# Patient Record
Sex: MALE | Race: ASIAN | NOT HISPANIC OR LATINO | URBAN - METROPOLITAN AREA
[De-identification: names, ages, dates, MRNs, and addresses within clinical notes are randomized per-mention and may not be internally consistent; named-entity substitution may affect disease eponyms.]

---

## 2020-09-11 ENCOUNTER — LAB OUTSIDE AN ENCOUNTER (OUTPATIENT)
Dept: URBAN - METROPOLITAN AREA CLINIC 96 | Facility: CLINIC | Age: 40
End: 2020-09-11

## 2020-09-11 ENCOUNTER — WEB ENCOUNTER (OUTPATIENT)
Dept: URBAN - METROPOLITAN AREA CLINIC 96 | Facility: CLINIC | Age: 40
End: 2020-09-11

## 2020-09-11 ENCOUNTER — OFFICE VISIT (OUTPATIENT)
Dept: URBAN - METROPOLITAN AREA CLINIC 96 | Facility: CLINIC | Age: 40
End: 2020-09-11
Payer: COMMERCIAL

## 2020-09-11 VITALS
DIASTOLIC BLOOD PRESSURE: 85 MMHG | HEART RATE: 97 BPM | BODY MASS INDEX: 27.32 KG/M2 | HEIGHT: 66 IN | WEIGHT: 170 LBS | SYSTOLIC BLOOD PRESSURE: 145 MMHG | TEMPERATURE: 97.9 F

## 2020-09-11 DIAGNOSIS — K21.0 REFLUX ESOPHAGITIS: ICD-10-CM

## 2020-09-11 DIAGNOSIS — K82.8 GALLBLADDER SLUDGE: ICD-10-CM

## 2020-09-11 DIAGNOSIS — R10.9 RIGHT SIDED ABDOMINAL PAIN: ICD-10-CM

## 2020-09-11 DIAGNOSIS — R19.4 CHANGE IN BOWEL HABITS: ICD-10-CM

## 2020-09-11 DIAGNOSIS — E66.3 OVERWEIGHT: ICD-10-CM

## 2020-09-11 DIAGNOSIS — R93.2 ABNORMAL ULTRASOUND OF GALLBLADDER: ICD-10-CM

## 2020-09-11 LAB
ABSOLUTE BASOPHIL COUNT: 0.04
ABSOLUTE EOSINOPHIL COUNT: 0.1
ABSOLUTE IMMATURE GRANULOCYTE  COUNT: 0.01
ABSOLUTE LYMPHOCYTE COUNT: 2.24
ABSOLUTE MONOCYTE COUNT: 0.39
ABSOLUTE NEUTROPHIL COUNT (ANC): 2.05
ABSOLUTE NRBC  COUNT: 0
AG RATIO: 2
ALBUMIN LEVEL: 4.9
ALK PHOS: 82
ALT: 94
ANION GAP: 9
AST: 46
BASOPHIL AUTO: 1
BILIRUBIN TOTAL: 0.4
BUN/CREAT RATIO: 8
BUN: 9
CALCIUM LEVEL: 9.4
CHLORIDE LEVEL: 103
CO2 LEVEL: 26
CREATININE LEVEL: 1.1
EOS AUTO: 2
GFR AFRICAN AMERICAN: >60
GFR NON AFRICAN AMERICAN: >60
GLUCOSE LEVEL: 238
HCT: 44.3
HGB: 14.5
IMMATURE GRANULOCYTES AUTO: 0.2
LYMPH AUTO: 46
MCH: 28.8
MCHC: 32.7
MCV: 87.9
MONO AUTO: 8
MPV: 11
NEUTRO AUTO: 42
NRBC AUTO: 0
OSMO (CALC): 282
PERFORMING LAB: (no result)
PLATELETS: 276
POTASSIUM LEVEL: 4.3
PROTEIN TOTAL: 7.9
RBC: 5.04
RDW: 12.4
SODIUM LEVEL: 138
WBC: 4.8

## 2020-09-11 PROCEDURE — 99213 OFFICE O/P EST LOW 20 MIN: CPT | Performed by: INTERNAL MEDICINE

## 2020-09-11 RX ORDER — PANTOPRAZOLE SODIUM 40 MG/1
TAKE 1 TABLET (40 MG) BY ORAL ROUTE ONCE DAILY FOR 90 DAYS TABLET, DELAYED RELEASE ORAL 1
Qty: 90 | Refills: 1 | Status: ACTIVE | COMMUNITY
Start: 2020-05-28 | End: 2020-11-24

## 2020-09-11 RX ORDER — PANTOPRAZOLE SODIUM 40 MG/1
1 TABLET TABLET, DELAYED RELEASE ORAL ONCE A DAY
Qty: 30 TABLET | Refills: 5 | OUTPATIENT
Start: 2020-09-11

## 2020-09-11 RX ORDER — ATORVASTATIN CALCIUM 20 MG/1
TABLET, FILM COATED ORAL
Qty: 0 | Refills: 0 | Status: ACTIVE | COMMUNITY
Start: 1900-01-01

## 2020-09-11 NOTE — HPI-OTHER HISTORIES
40 y.o. [place] male On R side, on and off, will have pain - not constant - little bit Has pain w/ going to bathroom Just started 2-3 days ago No blood in stool No fevers No N/V Feels better w/ food Not doing much exercise

## 2020-09-12 ENCOUNTER — WEB ENCOUNTER (OUTPATIENT)
Dept: URBAN - METROPOLITAN AREA CLINIC 92 | Facility: CLINIC | Age: 40
End: 2020-09-12

## 2021-02-15 ENCOUNTER — OFFICE VISIT (OUTPATIENT)
Dept: URBAN - METROPOLITAN AREA CLINIC 96 | Facility: CLINIC | Age: 41
End: 2021-02-15
Payer: COMMERCIAL

## 2021-02-15 ENCOUNTER — WEB ENCOUNTER (OUTPATIENT)
Dept: URBAN - METROPOLITAN AREA CLINIC 96 | Facility: CLINIC | Age: 41
End: 2021-02-15

## 2021-02-15 VITALS
HEART RATE: 94 BPM | BODY MASS INDEX: 27.16 KG/M2 | SYSTOLIC BLOOD PRESSURE: 122 MMHG | HEIGHT: 66 IN | DIASTOLIC BLOOD PRESSURE: 79 MMHG | WEIGHT: 169 LBS | TEMPERATURE: 97.3 F

## 2021-02-15 DIAGNOSIS — R10.13 DYSPEPSIA: ICD-10-CM

## 2021-02-15 PROCEDURE — 99213 OFFICE O/P EST LOW 20 MIN: CPT | Performed by: INTERNAL MEDICINE

## 2021-02-15 RX ORDER — PANTOPRAZOLE SODIUM 40 MG/1
1 TABLET TABLET, DELAYED RELEASE ORAL ONCE A DAY
Qty: 30 TABLET | Refills: 5 | Status: ACTIVE | COMMUNITY
Start: 2020-09-11

## 2021-02-15 RX ORDER — ATORVASTATIN CALCIUM 20 MG/1
TABLET, FILM COATED ORAL
Qty: 0 | Refills: 0 | Status: ACTIVE | COMMUNITY
Start: 1900-01-01

## 2021-02-15 NOTE — HPI-OTHER HISTORIES
40 y.o. [place] male 15 days ago started w/ stomach upset w/ indigestion, bad breath, nasal dripping, diarrhea, and abdominal discomfort When drinking tea, will get nausea and L sided body pain Associated w/ headache Using probiotics, FDGard and Pantoprazole and another OTC medicine - started getting better Getting burps out helps No blood in stool No fevers No vomiting; does have nausea No nocturnal syx Has problem w/ anxiety Did see urologist - nothing to worry about

## 2021-02-16 LAB
A/G RATIO: 2
ALBUMIN: 5.1
ALKALINE PHOSPHATASE: 83
ALT (SGPT): 57
AST (SGOT): 33
BASO (ABSOLUTE): 0
BASOS: 1
BILIRUBIN, TOTAL: 0.3
BUN/CREATININE RATIO: 8
BUN: 8
CALCIUM: 10.2
CARBON DIOXIDE, TOTAL: 21
CHLORIDE: 103
CREATININE: 1.02
EGFR IF AFRICN AM: 107
EGFR IF NONAFRICN AM: 93
EOS (ABSOLUTE): 0.3
EOS: 5
GLOBULIN, TOTAL: 2.5
GLUCOSE: 166
HEMATOCRIT: 42.4
HEMATOLOGY COMMENTS:: (no result)
HEMOGLOBIN: 14.3
IMMATURE CELLS: (no result)
IMMATURE GRANS (ABS): 0
IMMATURE GRANULOCYTES: 0
LIPASE: 38
LYMPHS (ABSOLUTE): 2.3
LYMPHS: 43
MCH: 29.4
MCHC: 33.7
MCV: 87
MONOCYTES(ABSOLUTE): 0.4
MONOCYTES: 7
NEUTROPHILS (ABSOLUTE): 2.3
NEUTROPHILS: 44
NRBC: (no result)
PLATELETS: 296
POTASSIUM: 4.8
PROTEIN, TOTAL: 7.6
RBC: 4.86
RDW: 12.6
SODIUM: 142
WBC: 5.2

## 2023-05-12 ENCOUNTER — OFFICE VISIT (OUTPATIENT)
Dept: URBAN - METROPOLITAN AREA CLINIC 23 | Facility: CLINIC | Age: 43
End: 2023-05-12

## 2023-06-12 ENCOUNTER — LAB OUTSIDE AN ENCOUNTER (OUTPATIENT)
Dept: URBAN - METROPOLITAN AREA CLINIC 23 | Facility: CLINIC | Age: 43
End: 2023-06-12

## 2023-06-12 ENCOUNTER — TELEPHONE ENCOUNTER (OUTPATIENT)
Dept: URBAN - METROPOLITAN AREA CLINIC 92 | Facility: CLINIC | Age: 43
End: 2023-06-12

## 2023-06-12 ENCOUNTER — OFFICE VISIT (OUTPATIENT)
Dept: URBAN - METROPOLITAN AREA CLINIC 23 | Facility: CLINIC | Age: 43
End: 2023-06-12
Payer: COMMERCIAL

## 2023-06-12 VITALS
HEIGHT: 66 IN | WEIGHT: 168.8 LBS | SYSTOLIC BLOOD PRESSURE: 124 MMHG | TEMPERATURE: 97.7 F | BODY MASS INDEX: 27.13 KG/M2 | HEART RATE: 84 BPM | DIASTOLIC BLOOD PRESSURE: 85 MMHG

## 2023-06-12 DIAGNOSIS — K21.9 ACID REFLUX: ICD-10-CM

## 2023-06-12 DIAGNOSIS — R10.84 ABDOMINAL CRAMPING, GENERALIZED: ICD-10-CM

## 2023-06-12 DIAGNOSIS — K82.8 GALLBLADDER SLUDGE: ICD-10-CM

## 2023-06-12 PROBLEM — 266433003: Status: ACTIVE | Noted: 2023-06-12

## 2023-06-12 PROCEDURE — 99214 OFFICE O/P EST MOD 30 MIN: CPT | Performed by: INTERNAL MEDICINE

## 2023-06-12 RX ORDER — PANTOPRAZOLE SODIUM 40 MG/1
1 TABLET TABLET, DELAYED RELEASE ORAL
Qty: 104 | Refills: 0 | OUTPATIENT
Start: 2023-06-12

## 2023-06-12 RX ORDER — ATORVASTATIN CALCIUM 20 MG/1
TABLET, FILM COATED ORAL
Qty: 0 | Refills: 0 | Status: ACTIVE | COMMUNITY
Start: 1900-01-01

## 2023-06-12 NOTE — PREVIOUS WORKUP REVIEWED
. ENDOSCOPIES -EGD 8/1/2018: LA grade a esophagitis.  Small sessile polyps in the gastric fundus.  Otherwise normal EGD. *Pathology: Random gastric-reactive gastropathy, mild chronic inflammation.  Negative for intestinal metaplasia and H. pylori infection.  Gastric fundus polyp-fundic gland polyp.  GE junction-reflux associated changes.  Negative for intestinal metaplasia.  LABS -Labs 4/14/2023: Total protein 7.9, albumin 5.0, alkaline phosphatase 68, ALT 56, AST 36, total bilirubin 0.5.  -Labs 1/31/2023: BUN 9, creatinine 0.8, total bilirubin 0.4, alkaline phosphatase 69, ALT 67 H, AST 41.  Hemoglobin A1c 6.8. -Labs 7/11/2018: WBC 6.6, hemoglobin 14.5, platelet 307, BUN 7, creatinine 0.76, total protein 7.6, albumin 4.9, total bilirubin 0.5, alkaline phosphatase 66, AST 26, ALT 55 H.  WBC 7, hemoglobin 14.8, platelet 294.  IMAGES -CT abdomen pelvis with contrast 9/17/2020: Hepatic steatosis.  Normal pancreas.  Patent intra-abdominal arterial andportal vss.  Normal bowel.  No evidence of acute cholecystitis.  -RUQ US 3/27/2020: Some mild sludge within the gallbladder.  CBD is mildly dilated measuring up to 6-7 mm. -RUQ US 11/16/2017: Images pancreas normal.  Increased echogenicity of the liver.  No biliary ductal dilatation.  CBD 5 mm.  Normal gallbladder without gallstones.

## 2023-06-12 NOTE — HPI-TODAY'S VISIT:
43-year-old male presents for chronic GI issues of discomfort in the right upper quadrant area/right-sided chest.  Nausea but no vomiting.  Indigestion.  Heartburn.   He was traveling to [place] recently, stayed there for 1 month. He thinks sx got worse during the travel.  Took omeprazole daily in [place], which helped. Currently he is not on the medication anymore. Discomfort gets worse after drinking tea or spicy food.  Happens every day.   Denies NSAIDs use.   He moves bowel twice or 3 times a day, pasty consistency, excessive straining/takes long to defecate. Denies unintentional weight loss.

## 2023-07-03 ENCOUNTER — LAB OUTSIDE AN ENCOUNTER (OUTPATIENT)
Dept: URBAN - METROPOLITAN AREA CLINIC 78 | Facility: CLINIC | Age: 43
End: 2023-07-03

## 2023-07-04 ENCOUNTER — DASHBOARD ENCOUNTERS (OUTPATIENT)
Age: 43
End: 2023-07-04

## 2023-07-11 ENCOUNTER — TELEPHONE ENCOUNTER (OUTPATIENT)
Dept: URBAN - METROPOLITAN AREA CLINIC 23 | Facility: CLINIC | Age: 43
End: 2023-07-11

## 2025-05-12 ENCOUNTER — LAB OUTSIDE AN ENCOUNTER (OUTPATIENT)
Dept: URBAN - METROPOLITAN AREA CLINIC 23 | Facility: CLINIC | Age: 45
End: 2025-05-12

## 2025-05-12 ENCOUNTER — OFFICE VISIT (OUTPATIENT)
Dept: URBAN - METROPOLITAN AREA CLINIC 23 | Facility: CLINIC | Age: 45
End: 2025-05-12
Payer: COMMERCIAL

## 2025-05-12 DIAGNOSIS — K59.09 CHRONIC CONSTIPATION: ICD-10-CM

## 2025-05-12 DIAGNOSIS — D50.0 IRON DEFICIENCY ANEMIA DUE TO CHRONIC BLOOD LOSS: ICD-10-CM

## 2025-05-12 DIAGNOSIS — K21.9 CHRONIC GERD: ICD-10-CM

## 2025-05-12 PROBLEM — 235595009: Status: ACTIVE | Noted: 2025-05-12

## 2025-05-12 PROCEDURE — 99214 OFFICE O/P EST MOD 30 MIN: CPT | Performed by: INTERNAL MEDICINE

## 2025-05-12 RX ORDER — PANTOPRAZOLE SODIUM 40 MG/1
1 TABLET BEFORE A MEAL TABLET, DELAYED RELEASE ORAL ONCE A DAY
Qty: 90 TABLET | Refills: 3
Start: 2023-06-12

## 2025-05-12 RX ORDER — ATORVASTATIN CALCIUM 20 MG/1
TABLET, FILM COATED ORAL
Qty: 0 | Refills: 0 | Status: ACTIVE | COMMUNITY
Start: 1900-01-01

## 2025-05-12 RX ORDER — PANTOPRAZOLE SODIUM 40 MG/1
1 TABLET TABLET, DELAYED RELEASE ORAL
Qty: 104 | Refills: 0 | Status: ACTIVE | COMMUNITY
Start: 2023-06-12

## 2025-05-12 NOTE — HPI-TODAY'S VISIT:
44-year-old male presents for follow-up of ER visit at [facility] 4/24/2025 with epigastric pain. Has had chronic issues for past 2 months, epigastric pain/substernal burning, bloating, nausea. He has a history of GERD with esophagitis, was not on pantoprazole for many years.  Pantoprazole started at the ER visit, he feels better with this medication. Denies dysphagia.  Denies unintentional weight loss, blood in stool or melena. On ER workup, minor anemia noted.

## 2025-05-12 NOTE — PREVIOUS WORKUP REVIEWED EXTERNAL MEDICAL RECORD
. ENDOSCOPIES -EGD 8/1/2018: LA grade a esophagitis.  Small sessile polyps in the gastric fundus.  Otherwise normal EGD. *Pathology: Random gastric-reactive gastropathy, mild chronic inflammation.  Negative for intestinal metaplasia and H. pylori infection.  Gastric fundus polyp-fundic gland polyp.  GE junction-reflux associated changes.  Negative for intestinal metaplasia.  LABS - Labs 4/24/2025: WBC 7.1, hemoglobin 13.6 L, platelet 333, sodium 135, potassium 3.6, BUN 8, creatinine 1.0, glucose 195, total protein 8.3, albumin 5.1, total bilirubin 0.4, alkaline phosphatase 101, AST 27, ALT 46, lipase 38.-Labs 4/14/2023: Total protein 7.9, albumin 5.0, alkaline phosphatase 68, ALT 56, AST 36, total bilirubin 0.5.  -Labs 1/31/2023: BUN 9, creatinine 0.8, total bilirubin 0.4, alkaline phosphatase 69, ALT 67 H, AST 41.  Hemoglobin A1c 6.8. -Labs 7/11/2018: WBC 6.6, hemoglobin 14.5, platelet 307, BUN 7, creatinine 0.76, total protein 7.6, albumin 4.9, total bilirubin 0.5, alkaline phosphatase 66, AST 26, ALT 55 H.  WBC 7, hemoglobin 14.8, platelet 294.  IMAGES -CT abdomen pelvis with contrast 4/25/2025: Normal. -HIDA scan 7/3/2023: Normal.  Ejection fraction 84%. -CT abdomen pelvis with contrast 9/17/2020: Hepatic steatosis.  Normal pancreas.  Patent intra-abdominal arterial andportal vss.  Normal bowel.  No evidence of acute cholecystitis.  -RUQ US 3/27/2020: Some mild sludge within the gallbladder.  CBD is mildly dilated measuring up to 6-7 mm. -RUQ US 11/16/2017: Images pancreas normal.  Increased echogenicity of the liver.  No biliary ductal dilatation.  CBD 5 mm.  Normal gallbladder without gallstones. , .

## 2025-07-08 ENCOUNTER — CLAIMS CREATED FROM THE CLAIM WINDOW (OUTPATIENT)
Dept: URBAN - METROPOLITAN AREA SURGERY CENTER 8 | Facility: SURGERY CENTER | Age: 45
End: 2025-07-08
Payer: COMMERCIAL

## 2025-07-08 ENCOUNTER — CLAIMS CREATED FROM THE CLAIM WINDOW (OUTPATIENT)
Dept: URBAN - METROPOLITAN AREA CLINIC 4 | Facility: CLINIC | Age: 45
End: 2025-07-08
Payer: COMMERCIAL

## 2025-07-08 DIAGNOSIS — K31.89 OTHER DISEASES OF STOMACH AND DUODENUM: ICD-10-CM

## 2025-07-08 DIAGNOSIS — Z12.11 COLON CANCER SCREENING: ICD-10-CM

## 2025-07-08 DIAGNOSIS — R12 HEARTBURN: ICD-10-CM

## 2025-07-08 DIAGNOSIS — D50.0 IRON DEFICIENCY ANEMIA SECONDARY TO BLOOD LOSS (CHRONIC): ICD-10-CM

## 2025-07-08 DIAGNOSIS — D50.0 IRON DEFICIENCY ANEMIA DUE TO CHRONIC BLOOD LOSS: ICD-10-CM

## 2025-07-08 DIAGNOSIS — E11.9 TYPE 2 DIABETES MELLITUS WITHOUT COMPLICATION, UNSPECIFIED WHETHER LONG TERM INSULIN USE: ICD-10-CM

## 2025-07-08 PROCEDURE — G0121 COLON CA SCRN NOT HI RSK IND: HCPCS | Performed by: INTERNAL MEDICINE

## 2025-07-08 PROCEDURE — 43239 EGD BIOPSY SINGLE/MULTIPLE: CPT | Performed by: INTERNAL MEDICINE

## 2025-07-08 PROCEDURE — 45378 DIAGNOSTIC COLONOSCOPY: CPT | Performed by: INTERNAL MEDICINE

## 2025-07-08 PROCEDURE — 0528F RCMND FLW-UP 10 YRS DOCD: CPT | Performed by: INTERNAL MEDICINE

## 2025-07-08 PROCEDURE — 88305 TISSUE EXAM BY PATHOLOGIST: CPT | Performed by: PATHOLOGY

## 2025-07-08 PROCEDURE — 00813 ANES UPR LWR GI NDSC PX: CPT | Performed by: STUDENT IN AN ORGANIZED HEALTH CARE EDUCATION/TRAINING PROGRAM

## 2025-07-08 RX ORDER — ATORVASTATIN CALCIUM 20 MG/1
TABLET, FILM COATED ORAL
Qty: 0 | Refills: 0 | Status: ACTIVE | COMMUNITY
Start: 1900-01-01

## 2025-07-08 RX ORDER — PANTOPRAZOLE SODIUM 40 MG/1
1 TABLET BEFORE A MEAL TABLET, DELAYED RELEASE ORAL ONCE A DAY
Qty: 90 TABLET | Refills: 3 | Status: ACTIVE | COMMUNITY
Start: 2023-06-12

## 2025-07-28 ENCOUNTER — TELEPHONE ENCOUNTER (OUTPATIENT)
Dept: URBAN - METROPOLITAN AREA CLINIC 23 | Facility: CLINIC | Age: 45
End: 2025-07-28

## 2025-07-28 ENCOUNTER — OFFICE VISIT (OUTPATIENT)
Dept: URBAN - METROPOLITAN AREA CLINIC 23 | Facility: CLINIC | Age: 45
End: 2025-07-28
Payer: COMMERCIAL

## 2025-07-28 DIAGNOSIS — D50.9 IRON DEFICIENCY ANEMIA, UNSPECIFIED IRON DEFICIENCY ANEMIA TYPE: ICD-10-CM

## 2025-07-28 PROCEDURE — 99213 OFFICE O/P EST LOW 20 MIN: CPT

## 2025-07-28 RX ORDER — PANTOPRAZOLE SODIUM 40 MG/1
1 TABLET 1/2 TO 1 HOUR BEFORE MORNING MEAL TABLET, DELAYED RELEASE ORAL ONCE A DAY
Qty: 90 TABLET | Refills: 3 | OUTPATIENT
Start: 2025-07-28

## 2025-07-28 RX ORDER — ATORVASTATIN CALCIUM 20 MG/1
TABLET, FILM COATED ORAL
Qty: 0 | Refills: 0 | Status: ACTIVE | COMMUNITY
Start: 1900-01-01

## 2025-07-28 RX ORDER — PANTOPRAZOLE SODIUM 40 MG/1
1 TABLET BEFORE A MEAL TABLET, DELAYED RELEASE ORAL ONCE A DAY
Qty: 90 TABLET | Refills: 3 | Status: ACTIVE | COMMUNITY
Start: 2023-06-12

## 2025-07-28 NOTE — HPI-TODAY'S VISIT:
45 yr old male here for follow up after egd and colonoscopy.   He was last seen in clinic May 2025 by Dr. Jonas for follow-up after recent ER visit for epigastric pain and mild anemia likely due to reflux disease. Symptoms better with pantoprazole 40 mg daily.  Recommend EGD and colonoscopy for iron deficiency anemia.  Of note, patient is partially vegetarian.  He underwent EGD and colonoscopy which were unremarkable.  Plan to repeat CBC and if anemia persists proceed with PillCam.  Today, he states he has been feeling well from a GI standpoint.  No signs of overt bleeding.  He has been using pantoprazole as needed.  No indigestion or epigastric pain

## 2025-07-29 LAB
ABSOLUTE BASOPHILS: 38
ABSOLUTE EOSINOPHILS: 70
ABSOLUTE LYMPHOCYTES: 2619
ABSOLUTE MONOCYTES: 454
ABSOLUTE NEUTROPHILS: 2219
BASOPHILS: 0.7
EOSINOPHILS: 1.3
HEMATOCRIT: 41
HEMOGLOBIN: 13.6
LYMPHOCYTES: 48.5
MCH: 29.2
MCHC: 33.2
MCV: 88.2
MONOCYTES: 8.4
MPV: 10.4
NEUTROPHILS: 41.1
PLATELET COUNT: 327
RDW: 13.2
RED BLOOD CELL COUNT: 4.65
WHITE BLOOD CELL COUNT: 5.4